# Patient Record
Sex: FEMALE | Race: WHITE | NOT HISPANIC OR LATINO | Employment: FULL TIME | URBAN - METROPOLITAN AREA
[De-identification: names, ages, dates, MRNs, and addresses within clinical notes are randomized per-mention and may not be internally consistent; named-entity substitution may affect disease eponyms.]

---

## 2023-05-07 ENCOUNTER — TELEMEDICINE (OUTPATIENT)
Dept: PRIMARY CARE | Facility: CLINIC | Age: 28
End: 2023-05-07
Payer: COMMERCIAL

## 2023-05-07 DIAGNOSIS — J02.9 PHARYNGITIS, UNSPECIFIED ETIOLOGY: Primary | ICD-10-CM

## 2023-05-07 DIAGNOSIS — B37.31 YEAST VAGINITIS: ICD-10-CM

## 2023-05-07 PROCEDURE — 99213 OFFICE O/P EST LOW 20 MIN: CPT | Performed by: NURSE PRACTITIONER

## 2023-05-07 RX ORDER — AMOXICILLIN 500 MG/1
500 CAPSULE ORAL
Qty: 14 CAPSULE | Refills: 0 | Status: SHIPPED | OUTPATIENT
Start: 2023-05-07 | End: 2023-05-14

## 2023-05-07 RX ORDER — FLUCONAZOLE 150 MG/1
150 TABLET ORAL ONCE
Qty: 1 TABLET | Refills: 0 | Status: SHIPPED | OUTPATIENT
Start: 2023-05-07 | End: 2023-05-07

## 2023-05-07 ASSESSMENT — ENCOUNTER SYMPTOMS
HOARSE VOICE: 0
FEVER: 0
LIGHT-HEADEDNESS: 0
GASTROINTESTINAL NEGATIVE: 1
CARDIOVASCULAR NEGATIVE: 1
SORE THROAT: 1
RHINORRHEA: 0
VOICE CHANGE: 0
TROUBLE SWALLOWING: 0
COUGH: 0
HEADACHES: 0
DIZZINESS: 0
ACTIVITY CHANGE: 0
MYALGIAS: 1

## 2023-05-07 NOTE — PROGRESS NOTES
Subjective   Patient ID: Lelia Vazquez is a 27 y.o. female who presents for Sore Throat (Started yesterday.).    Hurts to swallow  Lives with 3 yo child who attends school  Child has been ill recently   [facility] [place]: 04/23/23  States she is prone to yeast infection, would like to take a prophylactic dose           Sore Throat   This is a new problem. The current episode started yesterday. The problem has been gradually worsening. The pain is worse on the right side. There has been no fever. The pain is at a severity of 6/10. The pain is moderate. Pertinent negatives include no congestion, coughing, headaches, hoarse voice or trouble swallowing. She has had no exposure to strep or mono. She has tried NSAIDs for the symptoms. The treatment provided mild relief.        Review of Systems   Constitutional:  Negative for activity change and fever.   HENT:  Positive for sore throat. Negative for congestion, hoarse voice, postnasal drip, rhinorrhea, trouble swallowing and voice change.    Respiratory:  Negative for cough.    Cardiovascular: Negative.    Gastrointestinal: Negative.    Musculoskeletal:  Positive for myalgias (body aches).   Skin: Negative.    Neurological:  Negative for dizziness, light-headedness and headaches.       Objective   There were no vitals taken for this visit.    Physical Exam  Constitutional:       General: She is not in acute distress.     Appearance: Normal appearance.      Comments: Unable to perform complete physical exam due to virtual visit, patient was visualized on interactive video.      Eyes:      Pupils: Pupils are equal, round, and reactive to light.   Pulmonary:      Effort: Pulmonary effort is normal.   Neurological:      Mental Status: She is alert and oriented to person, place, and time.         Assessment/Plan   Diagnoses and all orders for this visit:  Pharyngitis, unspecified etiology  -     amoxicillin (Amoxil) 500 mg capsule; Take 1 capsule (500 mg) by mouth 2 times a day with meals  for 7 days.  Yeast vaginitis  -     fluconazole (Diflucan) 150 mg tablet; Take 1 tablet (150 mg) by mouth 1 time for 1 dose.

## 2023-05-12 ENCOUNTER — APPOINTMENT (OUTPATIENT)
Dept: PRIMARY CARE | Facility: CLINIC | Age: 28
End: 2023-05-12
Payer: COMMERCIAL